# Patient Record
(demographics unavailable — no encounter records)

---

## 2024-11-07 NOTE — HISTORY OF PRESENT ILLNESS
[Patient reported PAP Smear was normal] : Patient reported PAP Smear was normal [FreeTextEntry1] : Shahnaz Link 58yo female presents here for an annual.   Last pap 10/31/2023 normal   Patient states she had a double mastectomy in 2011 [PapSmeardate] : 10/31/2023 [Currently In Menopause] : currently in menopause [Currently Active] : currently active